# Patient Record
Sex: MALE | Race: WHITE | NOT HISPANIC OR LATINO | Employment: FULL TIME | URBAN - METROPOLITAN AREA
[De-identification: names, ages, dates, MRNs, and addresses within clinical notes are randomized per-mention and may not be internally consistent; named-entity substitution may affect disease eponyms.]

---

## 2017-07-21 ENCOUNTER — NON-PROVIDER VISIT (OUTPATIENT)
Dept: OCCUPATIONAL MEDICINE | Facility: CLINIC | Age: 62
End: 2017-07-21

## 2017-07-21 DIAGNOSIS — Z02.1 PRE-EMPLOYMENT DRUG SCREENING: ICD-10-CM

## 2017-09-19 ENCOUNTER — NON-PROVIDER VISIT (OUTPATIENT)
Dept: URGENT CARE | Facility: PHYSICIAN GROUP | Age: 62
End: 2017-09-19

## 2017-09-19 DIAGNOSIS — Z02.1 PRE-EMPLOYMENT DRUG SCREENING: ICD-10-CM

## 2017-09-19 LAB
AMP AMPHETAMINE: NORMAL
COC COCAINE: NORMAL
INT CON NEG: NEGATIVE
INT CON POS: POSITIVE
MET METHAMPHETAMINES: NORMAL
OPI OPIATES: NORMAL
PCP PHENCYCLIDINE: NORMAL
POC DRUG COMMENT 753798-OCCUPATIONAL HEALTH: NORMAL
THC: NORMAL

## 2017-09-19 PROCEDURE — 80305 DRUG TEST PRSMV DIR OPT OBS: CPT | Performed by: PHYSICIAN ASSISTANT

## 2017-10-05 ENCOUNTER — NON-PROVIDER VISIT (OUTPATIENT)
Dept: OCCUPATIONAL MEDICINE | Facility: CLINIC | Age: 62
End: 2017-10-05

## 2017-10-05 DIAGNOSIS — Z02.1 PRE-EMPLOYMENT DRUG SCREENING: ICD-10-CM

## 2017-10-05 LAB
AMP AMPHETAMINE: NORMAL
COC COCAINE: NORMAL
INT CON NEG: NORMAL
INT CON POS: NORMAL
MET METHAMPHETAMINES: NORMAL
OPI OPIATES: NORMAL
PCP PHENCYCLIDINE: NORMAL
POC DRUG COMMENT 753798-OCCUPATIONAL HEALTH: NORMAL
THC: NORMAL

## 2017-10-05 PROCEDURE — 80305 DRUG TEST PRSMV DIR OPT OBS: CPT | Performed by: PREVENTIVE MEDICINE

## 2018-06-06 ENCOUNTER — NON-PROVIDER VISIT (OUTPATIENT)
Dept: OCCUPATIONAL MEDICINE | Facility: CLINIC | Age: 63
End: 2018-06-06

## 2018-06-06 DIAGNOSIS — Z02.1 PRE-EMPLOYMENT DRUG SCREENING: ICD-10-CM

## 2018-06-06 PROCEDURE — 80305 DRUG TEST PRSMV DIR OPT OBS: CPT | Performed by: PREVENTIVE MEDICINE

## 2020-01-06 ENCOUNTER — HOSPITAL ENCOUNTER (EMERGENCY)
Facility: MEDICAL CENTER | Age: 65
End: 2020-01-06
Attending: EMERGENCY MEDICINE
Payer: COMMERCIAL

## 2020-01-06 VITALS
SYSTOLIC BLOOD PRESSURE: 107 MMHG | HEART RATE: 52 BPM | WEIGHT: 180 LBS | BODY MASS INDEX: 24.38 KG/M2 | DIASTOLIC BLOOD PRESSURE: 66 MMHG | RESPIRATION RATE: 19 BRPM | HEIGHT: 72 IN | TEMPERATURE: 99.7 F | OXYGEN SATURATION: 96 %

## 2020-01-06 DIAGNOSIS — R42 VERTIGO: ICD-10-CM

## 2020-01-06 LAB
ALBUMIN SERPL BCP-MCNC: 3.8 G/DL (ref 3.2–4.9)
ALBUMIN/GLOB SERPL: 1.2 G/DL
ALP SERPL-CCNC: 48 U/L (ref 30–99)
ALT SERPL-CCNC: 124 U/L (ref 2–50)
ANION GAP SERPL CALC-SCNC: 7 MMOL/L (ref 0–11.9)
APPEARANCE UR: CLEAR
AST SERPL-CCNC: 87 U/L (ref 12–45)
BASOPHILS # BLD AUTO: 1.2 % (ref 0–1.8)
BASOPHILS # BLD: 0.05 K/UL (ref 0–0.12)
BILIRUB SERPL-MCNC: 1 MG/DL (ref 0.1–1.5)
BILIRUB UR QL STRIP.AUTO: NEGATIVE
BUN SERPL-MCNC: 18 MG/DL (ref 8–22)
CALCIUM SERPL-MCNC: 9 MG/DL (ref 8.5–10.5)
CHLORIDE SERPL-SCNC: 105 MMOL/L (ref 96–112)
CO2 SERPL-SCNC: 25 MMOL/L (ref 20–33)
COLOR UR: YELLOW
CREAT SERPL-MCNC: 0.68 MG/DL (ref 0.5–1.4)
EKG IMPRESSION: NORMAL
EOSINOPHIL # BLD AUTO: 0.23 K/UL (ref 0–0.51)
EOSINOPHIL NFR BLD: 5.5 % (ref 0–6.9)
ERYTHROCYTE [DISTWIDTH] IN BLOOD BY AUTOMATED COUNT: 38 FL (ref 35.9–50)
GLOBULIN SER CALC-MCNC: 3.3 G/DL (ref 1.9–3.5)
GLUCOSE SERPL-MCNC: 83 MG/DL (ref 65–99)
GLUCOSE UR STRIP.AUTO-MCNC: NEGATIVE MG/DL
HCT VFR BLD AUTO: 45.6 % (ref 42–52)
HGB BLD-MCNC: 15.8 G/DL (ref 14–18)
IMM GRANULOCYTES # BLD AUTO: 0 K/UL (ref 0–0.11)
IMM GRANULOCYTES NFR BLD AUTO: 0 % (ref 0–0.9)
KETONES UR STRIP.AUTO-MCNC: 15 MG/DL
LEUKOCYTE ESTERASE UR QL STRIP.AUTO: NEGATIVE
LYMPHOCYTES # BLD AUTO: 1.22 K/UL (ref 1–4.8)
LYMPHOCYTES NFR BLD: 29 % (ref 22–41)
MCH RBC QN AUTO: 30.7 PG (ref 27–33)
MCHC RBC AUTO-ENTMCNC: 34.6 G/DL (ref 33.7–35.3)
MCV RBC AUTO: 88.5 FL (ref 81.4–97.8)
MICRO URNS: ABNORMAL
MONOCYTES # BLD AUTO: 0.42 K/UL (ref 0–0.85)
MONOCYTES NFR BLD AUTO: 10 % (ref 0–13.4)
NEUTROPHILS # BLD AUTO: 2.29 K/UL (ref 1.82–7.42)
NEUTROPHILS NFR BLD: 54.3 % (ref 44–72)
NITRITE UR QL STRIP.AUTO: NEGATIVE
NRBC # BLD AUTO: 0 K/UL
NRBC BLD-RTO: 0 /100 WBC
PH UR STRIP.AUTO: 6 [PH] (ref 5–8)
PLATELET # BLD AUTO: 161 K/UL (ref 164–446)
PMV BLD AUTO: 10 FL (ref 9–12.9)
POTASSIUM SERPL-SCNC: 4 MMOL/L (ref 3.6–5.5)
PROT SERPL-MCNC: 7.1 G/DL (ref 6–8.2)
PROT UR QL STRIP: NEGATIVE MG/DL
RBC # BLD AUTO: 5.15 M/UL (ref 4.7–6.1)
RBC UR QL AUTO: NEGATIVE
SODIUM SERPL-SCNC: 137 MMOL/L (ref 135–145)
SP GR UR STRIP.AUTO: 1.02
UROBILINOGEN UR STRIP.AUTO-MCNC: 1 MG/DL
WBC # BLD AUTO: 4.2 K/UL (ref 4.8–10.8)

## 2020-01-06 PROCEDURE — 81003 URINALYSIS AUTO W/O SCOPE: CPT

## 2020-01-06 PROCEDURE — 99285 EMERGENCY DEPT VISIT HI MDM: CPT

## 2020-01-06 PROCEDURE — 700111 HCHG RX REV CODE 636 W/ 250 OVERRIDE (IP): Performed by: EMERGENCY MEDICINE

## 2020-01-06 PROCEDURE — 96374 THER/PROPH/DIAG INJ IV PUSH: CPT

## 2020-01-06 PROCEDURE — 93005 ELECTROCARDIOGRAM TRACING: CPT

## 2020-01-06 PROCEDURE — 93005 ELECTROCARDIOGRAM TRACING: CPT | Performed by: EMERGENCY MEDICINE

## 2020-01-06 PROCEDURE — 80053 COMPREHEN METABOLIC PANEL: CPT

## 2020-01-06 PROCEDURE — 85025 COMPLETE CBC W/AUTO DIFF WBC: CPT

## 2020-01-06 RX ORDER — METOCLOPRAMIDE 10 MG/1
10 TABLET ORAL 4 TIMES DAILY PRN
Qty: 20 TAB | Refills: 0 | Status: SHIPPED | OUTPATIENT
Start: 2020-01-06 | End: 2022-05-31

## 2020-01-06 RX ORDER — LORAZEPAM 2 MG/ML
0.5 INJECTION INTRAMUSCULAR ONCE
Status: COMPLETED | OUTPATIENT
Start: 2020-01-06 | End: 2020-01-06

## 2020-01-06 RX ORDER — DIAZEPAM 2 MG/1
2 TABLET ORAL EVERY 6 HOURS PRN
Qty: 10 TAB | Refills: 0 | Status: SHIPPED | OUTPATIENT
Start: 2020-01-06 | End: 2020-01-11

## 2020-01-06 RX ORDER — MECLIZINE HYDROCHLORIDE 25 MG/1
25 TABLET ORAL 3 TIMES DAILY PRN
Qty: 30 TAB | Refills: 0 | Status: SHIPPED | OUTPATIENT
Start: 2020-01-06 | End: 2022-05-31

## 2020-01-06 RX ADMIN — LORAZEPAM 0.5 MG: 2 INJECTION INTRAMUSCULAR; INTRAVENOUS at 10:45

## 2020-01-06 SDOH — HEALTH STABILITY: MENTAL HEALTH: HOW OFTEN DO YOU HAVE A DRINK CONTAINING ALCOHOL?: NEVER

## 2020-01-06 ASSESSMENT — ENCOUNTER SYMPTOMS
HEADACHES: 1
BACK PAIN: 1
EYES NEGATIVE: 1
PSYCHIATRIC NEGATIVE: 1
BLOOD IN STOOL: 0
LOSS OF CONSCIOUSNESS: 0
TREMORS: 0
NECK PAIN: 0
VOMITING: 1
DIARRHEA: 0
NAUSEA: 1
HEARTBURN: 0
FEVER: 0
SEIZURES: 0
SENSORY CHANGE: 0
MYALGIAS: 0
RESPIRATORY NEGATIVE: 1
DIAPHORESIS: 0
FLANK PAIN: 0
SPEECH CHANGE: 0
ABDOMINAL PAIN: 0
CARDIOVASCULAR NEGATIVE: 1
FOCAL WEAKNESS: 0
CONSTIPATION: 0
CHILLS: 0
DIZZINESS: 1
WEIGHT LOSS: 0
TINGLING: 0
FALLS: 0
WEAKNESS: 1

## 2020-01-06 ASSESSMENT — PAIN SCALES - WONG BAKER: WONGBAKER_NUMERICALRESPONSE: HURTS EVEN MORE

## 2020-01-06 NOTE — ED TRIAGE NOTES
Chief Complaint   Patient presents with   • Dizziness     ambulates to room w/steady gait. c/o dizziness describes as room spinning.    • Flank Pain     x few days. also states urine appears dark yellowish brown   • N/V     this morning     Aaox4. No neuro deficits noted. Educated on triage process. Instructed to notify staff for any worsening symptoms.

## 2020-01-06 NOTE — ED PROVIDER NOTES
ED Provider Note    Scribed for Kings Bridges M.D. by Gina Keene. 1/6/2020, 10:20 AM.    Primary care provider: Pcp Pt States None  Means of arrival: Walk-In  History obtained from: Patient  History limited by: None    CHIEF COMPLAINT  Chief Complaint   Patient presents with   • Dizziness     ambulates to room w/steady gait. c/o dizziness describes as room spinning.    • Flank Pain     x few days. also states urine appears dark yellowish brown   • N/V     this morning       HPI  Luis Drake Jr. is a 64 y.o. male who presents to the Emergency Department for evaluation of dizziness onset yesterday. 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 He then vomited, and was sent home. He states that he was able to drive home fine. He reports that his symptoms have been constant since onset. No alleviating or exacerbating factors noted. Associated decreased appetite, dark yellow urine, low back pain, nausea, and headache. Denies tinnitus, aphasia, weakness in lower extremities, diplopia, or any recent illness.No prior history of similar symptoms. Denies use of alcohol, drugs, or cigarettes.     REVIEW OF SYSTEMS  Review of Systems   Constitutional: Positive for malaise/fatigue. Negative for chills, diaphoresis, fever and weight loss.   HENT: Negative.    Eyes: Negative.    Respiratory: Negative.    Cardiovascular: Negative.    Gastrointestinal: Positive for nausea and vomiting. Negative for abdominal pain, blood in stool, constipation, diarrhea, heartburn and melena.   Genitourinary: Negative for dysuria, flank pain, frequency, hematuria and urgency.        Positive for dark yellow urine.    Musculoskeletal: Positive for back pain. Negative for falls, joint pain, myalgias and neck pain.   Skin: Negative.    Neurological: Positive for dizziness, weakness and headaches. Negative for tingling, tremors, sensory change, speech change, focal weakness, seizures and loss of  consciousness.   Endo/Heme/Allergies: Negative.    Psychiatric/Behavioral: Negative.    All other systems reviewed and are negative.      PAST MEDICAL HISTORY  No pertinent past medical history noted.     SURGICAL HISTORY  patient denies any surgical history    SOCIAL HISTORY  Social History     Tobacco Use   • Smoking status: Never Smoker   • Smokeless tobacco: Never Used   Substance Use Topics   • Alcohol use: Never     Frequency: Never   • Drug use: Never      Social History     Substance and Sexual Activity   Drug Use Never       FAMILY HISTORY  History reviewed. No pertinent family history.    CURRENT MEDICATIONS  Home Medications     Reviewed by Danyelle Doll R.N. (Registered Nurse) on 01/06/20 at 0947  Med List Status: <None>   Medication Last Dose Status        Patient Brenden Taking any Medications                     Results for orders placed or performed during the hospital encounter of 01/06/20   CBC WITH DIFFERENTIAL   Result Value Ref Range    WBC 4.2 (L) 4.8 - 10.8 K/uL    RBC 5.15 4.70 - 6.10 M/uL    Hemoglobin 15.8 14.0 - 18.0 g/dL    Hematocrit 45.6 42.0 - 52.0 %    MCV 88.5 81.4 - 97.8 fL    MCH 30.7 27.0 - 33.0 pg    MCHC 34.6 33.7 - 35.3 g/dL    RDW 38.0 35.9 - 50.0 fL    Platelet Count 161 (L) 164 - 446 K/uL    MPV 10.0 9.0 - 12.9 fL    Neutrophils-Polys 54.30 44.00 - 72.00 %    Lymphocytes 29.00 22.00 - 41.00 %    Monocytes 10.00 0.00 - 13.40 %    Eosinophils 5.50 0.00 - 6.90 %    Basophils 1.20 0.00 - 1.80 %    Immature Granulocytes 0.00 0.00 - 0.90 %    Nucleated RBC 0.00 /100 WBC    Neutrophils (Absolute) 2.29 1.82 - 7.42 K/uL    Lymphs (Absolute) 1.22 1.00 - 4.80 K/uL    Monos (Absolute) 0.42 0.00 - 0.85 K/uL    Eos (Absolute) 0.23 0.00 - 0.51 K/uL    Baso (Absolute) 0.05 0.00 - 0.12 K/uL    Immature Granulocytes (abs) 0.00 0.00 - 0.11 K/uL    NRBC (Absolute) 0.00 K/uL   COMP METABOLIC PANEL   Result Value Ref Range    Sodium 137 135 - 145 mmol/L    Potassium 4.0 3.6 - 5.5 mmol/L     Chloride 105 96 - 112 mmol/L    Co2 25 20 - 33 mmol/L    Anion Gap 7.0 0.0 - 11.9    Glucose 83 65 - 99 mg/dL    Bun 18 8 - 22 mg/dL    Creatinine 0.68 0.50 - 1.40 mg/dL    Calcium 9.0 8.5 - 10.5 mg/dL    AST(SGOT) 87 (H) 12 - 45 U/L    ALT(SGPT) 124 (H) 2 - 50 U/L    Alkaline Phosphatase 48 30 - 99 U/L    Total Bilirubin 1.0 0.1 - 1.5 mg/dL    Albumin 3.8 3.2 - 4.9 g/dL    Total Protein 7.1 6.0 - 8.2 g/dL    Globulin 3.3 1.9 - 3.5 g/dL    A-G Ratio 1.2 g/dL   URINALYSIS CULTURE, IF INDICATED   Result Value Ref Range    Color Yellow     Character Clear     Specific Gravity 1.017 <1.035    Ph 6.0 5.0 - 8.0    Glucose Negative Negative mg/dL    Ketones 15 (A) Negative mg/dL    Protein Negative Negative mg/dL    Bilirubin Negative Negative    Urobilinogen, Urine 1.0 Negative    Nitrite Negative Negative    Leukocyte Esterase Negative Negative    Occult Blood Negative Negative    Micro Urine Req see below    ESTIMATED GFR   Result Value Ref Range    GFR If African American >60 >60 mL/min/1.73 m 2    GFR If Non African American >60 >60 mL/min/1.73 m 2   EKG (NOW)   Result Value Ref Range    Report       [facility] Emergency Dept.    Test Date:  2020  Pt Name:    COLLETTE WHITFIELD                Department: ER  MRN:        [id number]                      Room:  Gender:     Male                         Technician: [id number]  :        1955                   Requested By:ER TRIAGE PROTOCOL  Order #:    [id number]                    Reading MD:    Measurements  Intervals                                Axis  Rate:       74                           P:          67  AR:         145                          QRS:        32  QRSD:       91                           T:          25  QT:         396  QTc:        440    Interpretive Statements  Sinus rhythm  No previous ECG available for comparison        ALLERGIES  No Known Allergies    PHYSICAL EXAM  VITAL SIGNS: /82   Pulse 68   Temp 37.6 °C (99.7 °F)  (Temporal)   Resp 14   Ht 1.829 m (6')   Wt 81.6 kg (180 lb)   SpO2 96%   BMI 24.41 kg/m²     Constitutional:  Mild acute distress  HENT:  Moist mucous membranes  Eyes:  Pupils are round and reactive to light. No conjunctivitis or icterus  Neck: trachea is midline, no palpable thyroid  Cardiovascular: Regular rate and rhythm, no murmurs  Thorax & Lungs: Normal breath sounds, no rhonchi  Abdomen: Soft, Non-tender  Skin:. Warm, dry, no rash  Back: Non-tender, no CVA tenderness  Extremities:  no edema  Vascular:  symmetric radial pulse  Neurologic: Alert and oriented. Off balance with Romberg. Right sided gaze nystagmus. Cranial nerves II-XII intact, EOMs intact, no tongue deviation, PERRL, no facial asymmetry to motor or sensation, symmetric palate, normal finger-to-nose test, no pronator drift. No focal motor deficits. Symmetric reflexes. Normal station and gait, normal tandem walk. HiNT exam shows normal test of skew with delayed saccade on the head impulse test implication is peripheral vertigo    LABS  Results for orders placed or performed during the hospital encounter of 01/06/20   CBC WITH DIFFERENTIAL   Result Value Ref Range    WBC 4.2 (L) 4.8 - 10.8 K/uL    RBC 5.15 4.70 - 6.10 M/uL    Hemoglobin 15.8 14.0 - 18.0 g/dL    Hematocrit 45.6 42.0 - 52.0 %    MCV 88.5 81.4 - 97.8 fL    MCH 30.7 27.0 - 33.0 pg    MCHC 34.6 33.7 - 35.3 g/dL    RDW 38.0 35.9 - 50.0 fL    Platelet Count 161 (L) 164 - 446 K/uL    MPV 10.0 9.0 - 12.9 fL    Neutrophils-Polys 54.30 44.00 - 72.00 %    Lymphocytes 29.00 22.00 - 41.00 %    Monocytes 10.00 0.00 - 13.40 %    Eosinophils 5.50 0.00 - 6.90 %    Basophils 1.20 0.00 - 1.80 %    Immature Granulocytes 0.00 0.00 - 0.90 %    Nucleated RBC 0.00 /100 WBC    Neutrophils (Absolute) 2.29 1.82 - 7.42 K/uL    Lymphs (Absolute) 1.22 1.00 - 4.80 K/uL    Monos (Absolute) 0.42 0.00 - 0.85 K/uL    Eos (Absolute) 0.23 0.00 - 0.51 K/uL    Baso (Absolute) 0.05 0.00 - 0.12 K/uL    Immature  Granulocytes (abs) 0.00 0.00 - 0.11 K/uL    NRBC (Absolute) 0.00 K/uL   COMP METABOLIC PANEL   Result Value Ref Range    Sodium 137 135 - 145 mmol/L    Potassium 4.0 3.6 - 5.5 mmol/L    Chloride 105 96 - 112 mmol/L    Co2 25 20 - 33 mmol/L    Anion Gap 7.0 0.0 - 11.9    Glucose 83 65 - 99 mg/dL    Bun 18 8 - 22 mg/dL    Creatinine 0.68 0.50 - 1.40 mg/dL    Calcium 9.0 8.5 - 10.5 mg/dL    AST(SGOT) 87 (H) 12 - 45 U/L    ALT(SGPT) 124 (H) 2 - 50 U/L    Alkaline Phosphatase 48 30 - 99 U/L    Total Bilirubin 1.0 0.1 - 1.5 mg/dL    Albumin 3.8 3.2 - 4.9 g/dL    Total Protein 7.1 6.0 - 8.2 g/dL    Globulin 3.3 1.9 - 3.5 g/dL    A-G Ratio 1.2 g/dL   URINALYSIS CULTURE, IF INDICATED   Result Value Ref Range    Color Yellow     Character Clear     Specific Gravity 1.017 <1.035    Ph 6.0 5.0 - 8.0    Glucose Negative Negative mg/dL    Ketones 15 (A) Negative mg/dL    Protein Negative Negative mg/dL    Bilirubin Negative Negative    Urobilinogen, Urine 1.0 Negative    Nitrite Negative Negative    Leukocyte Esterase Negative Negative    Occult Blood Negative Negative    Micro Urine Req see below    ESTIMATED GFR   Result Value Ref Range    GFR If African American >60 >60 mL/min/1.73 m 2    GFR If Non African American >60 >60 mL/min/1.73 m 2   EKG (NOW)   Result Value Ref Range    Report       [facility] Emergency Dept.    Test Date:  2020  Pt Name:    COLLETTE WHITFIELD                Department: ER  MRN:        [id number]                      Room:  Gender:     Male                         Technician: [id number]  :        1955                   Requested By:ER TRIAGE PROTOCOL  Order #:    [id number]                    Yaquelin STOLL:    Measurements  Intervals                                Axis  Rate:       74                           P:          67  AL:         145                          QRS:        32  QRSD:       91                           T:          25  QT:         396  QTc:         440    Interpretive Statements  Sinus rhythm  No previous ECG available for comparison       All labs reviewed by me.    COURSE & MEDICAL DECISION MAKING  Pertinent Labs & Imaging studies reviewed. (See chart for details)    10:20 AM - Patient seen and examined at bedside. Patient will be treated with Ativan 0.5 mg. Ordered EKG, UA, CMP, and CBC with diff to evaluate his symptoms. The differential diagnoses include but are not limited to: Peripheral vertigo versus central vertigo    11:00 AM - Patient's lab work was reviewed. His liver enzymes are elevated, which could be causing his dark urine.     12:00 PM - Patient was reevaluated at bedside. Discussed lab and radiology results with the patient and informed them that they do have vertigo. I also informed him of his elevated liver enzymes. I advised him to be further evaluated for it. He currently shows no signs of Hepatitis.     12:58 PM - Patient was reevaluated at bedside. I discussed with him that it takes a while for vertigo to go away, and instructed him to take over the counter medication as needed as well as to try balance activities at home. I informed the patient of my plan for discharge, which includes strict return precautions for any new or worsening symptoms. Patient understands and verbalizes agreement to plan of care. Patient is comfortable going home at this time.      Medical Decision Making:   Labs are unremarkable.  The patient's neuro exam reveals right extreme gaze nystagmus.  The HINTS exam reveals a delayed saccade to the head impulse test and a normal test of skew.  This implies a peripheral lesion.  Patient was given benzodiazepine with some improvement.  The remainder of the neuro exam is normal.  I do not think further evaluation is needed.  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  I am in a place him on benzodiazepine meclizine and Reglan he is given return precautions and  follow-up    The patient will return for new or worsening symptoms and is stable at the time of discharge.    The patient is referred to a primary physician for blood pressure management, diabetic screening, and for all other preventative health concerns.    DISPOSITION:  Patient will be discharged home in stable condition.    FOLLOW UP:  [address] [address]  [phone]          OUTPATIENT MEDICATIONS:  Discharge Medication List as of 1/6/2020 12:59 PM      START taking these medications    Details   diazePAM (VALIUM) 2 MG Tab Take 1 Tab by mouth every 6 hours as needed for Anxiety for up to 5 days., Disp-10 Tab, R-0, Print Rx Paper      meclizine (ANTIVERT) 25 MG Tab Take 1 Tab by mouth 3 times a day as needed., Disp-30 Tab, R-0, Print Rx Paper      metoclopramide (REGLAN) 10 MG Tab Take 1 Tab by mouth 4 times a day as needed (prn nausea)., Disp-20 Tab, R-0, Print Rx Paper               FINAL IMPRESSION  1. Vertigo          Gina STEVENS (Scribe), am scribing for, and in the presence of, Kings Bridges M.D..    Electronically signed by: Gina Keene (Scribe), 1/6/2020    Kings STEVENS M.D. personally performed the services described in this documentation, as scribed by Gina Keene in my presence, and it is both accurate and complete.    C    The note accurately reflects work and decisions made by me.  Kings Bridges  1/6/2020  3:19 PM

## 2021-03-03 DIAGNOSIS — Z23 NEED FOR VACCINATION: ICD-10-CM

## 2022-05-19 ENCOUNTER — NON-PROVIDER VISIT (OUTPATIENT)
Dept: OCCUPATIONAL MEDICINE | Facility: CLINIC | Age: 67
End: 2022-05-19

## 2022-05-19 DIAGNOSIS — Z02.89 VISIT FOR OCCUPATIONAL HEALTH EXAMINATION: ICD-10-CM

## 2022-05-19 DIAGNOSIS — Z02.1 PRE-EMPLOYMENT DRUG SCREENING: ICD-10-CM

## 2022-05-19 LAB
AMP AMPHETAMINE: NORMAL
AMP AMPHETAMINE: NORMAL
BAR BARBITURATES: NORMAL
BZO BENZODIAZEPINES: NORMAL
COC COCAINE: NORMAL
COC COCAINE: NORMAL
INT CON NEG: NORMAL
INT CON NEG: NORMAL
INT CON POS: NORMAL
INT CON POS: NORMAL
MDMA ECSTASY: NORMAL
MET METHAMPHETAMINES: NORMAL
MET METHAMPHETAMINES: NORMAL
MTD METHADONE: NORMAL
OPI OPIATES: NORMAL
OPI OPIATES: NORMAL
OXY OXYCODONE: NORMAL
PCP PHENCYCLIDINE: NORMAL
PCP PHENCYCLIDINE: NORMAL
POC DRUG COMMENT 753798-OCCUPATIONAL HEALTH: NORMAL
POC URINE DRUG SCREEN OCDRS: NORMAL
THC: NORMAL
THC: NORMAL

## 2022-05-19 PROCEDURE — 80305 DRUG TEST PRSMV DIR OPT OBS: CPT | Performed by: NURSE PRACTITIONER

## 2022-05-27 ENCOUNTER — APPOINTMENT (OUTPATIENT)
Dept: RADIOLOGY | Facility: MEDICAL CENTER | Age: 67
End: 2022-05-27
Attending: EMERGENCY MEDICINE
Payer: COMMERCIAL

## 2022-05-27 ENCOUNTER — HOSPITAL ENCOUNTER (EMERGENCY)
Facility: MEDICAL CENTER | Age: 67
End: 2022-05-27
Attending: EMERGENCY MEDICINE
Payer: COMMERCIAL

## 2022-05-27 VITALS
TEMPERATURE: 98.9 F | WEIGHT: 188.05 LBS | HEART RATE: 75 BPM | OXYGEN SATURATION: 96 % | SYSTOLIC BLOOD PRESSURE: 120 MMHG | BODY MASS INDEX: 25.47 KG/M2 | RESPIRATION RATE: 16 BRPM | HEIGHT: 72 IN | DIASTOLIC BLOOD PRESSURE: 79 MMHG

## 2022-05-27 DIAGNOSIS — R07.89 CHEST WALL PAIN: ICD-10-CM

## 2022-05-27 PROCEDURE — 99284 EMERGENCY DEPT VISIT MOD MDM: CPT

## 2022-05-27 PROCEDURE — 73000 X-RAY EXAM OF COLLAR BONE: CPT | Mod: LT

## 2022-05-27 PROCEDURE — 71046 X-RAY EXAM CHEST 2 VIEWS: CPT

## 2022-05-27 PROCEDURE — 700102 HCHG RX REV CODE 250 W/ 637 OVERRIDE(OP): Performed by: EMERGENCY MEDICINE

## 2022-05-27 PROCEDURE — A9270 NON-COVERED ITEM OR SERVICE: HCPCS | Performed by: EMERGENCY MEDICINE

## 2022-05-27 RX ORDER — NAPROXEN 500 MG/1
500 TABLET ORAL 2 TIMES DAILY
Status: DISCONTINUED | OUTPATIENT
Start: 2022-05-27 | End: 2022-05-27 | Stop reason: HOSPADM

## 2022-05-27 RX ADMIN — NAPROXEN 500 MG: 500 TABLET ORAL at 12:28

## 2022-05-27 ASSESSMENT — PAIN DESCRIPTION - PAIN TYPE: TYPE: ACUTE PAIN

## 2022-05-27 NOTE — ED PROVIDER NOTES
ED Provider Note    Scribed for Yunior Avila M.D. by Rhiannon Kuo. 5/27/2022  11:59 AM    Primary care provider: Pcp Pt States None  Means of arrival: Walk-in  History obtained from: Patient  History limited by: None    CHIEF COMPLAINT  Chief Complaint   Patient presents with   • Shoulder Pain     New job and lifting tote from knee level to shoulder level, reports felt a pop to left shoulder and unable to lift arm up.         HPI  Luis Drake Jr. is a 66 y.o. male who presents to the Emergency Department for left shoulder pain acute onset last night at work. 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 He reports feeling a sudden incident of injury while working last night. His pain is exacerbated with deep breaths. He denies numbness or tingling in the extremity, fevers, chest pain, shortness of breath, nausea, and vomiting. He denies taking any pain medications. He denies taking any blood thinners. He denies any history of rib fracture.     REVIEW OF SYSTEMS  Pertinent positives include: shoulder pain.  Pertinent negatives include: numbness or tingling in the extremity, fevers, chest pain, shortness of breath, nausea, and vomiting..     PAST MEDICAL HISTORY  Past Medical History:   Diagnosis Date   • Vertigo        FAMILY HISTORY  History reviewed. No pertinent family history.    SOCIAL HISTORY  Social History     Tobacco Use   • Smoking status: Never Smoker   • Smokeless tobacco: Never Used   Vaping Use   • Vaping Use: Never used   Substance Use Topics   • Alcohol use: Never   • Drug use: Never     Social History     Substance and Sexual Activity   Drug Use Never       CURRENT MEDICATIONS  Home Medications     Reviewed by Maura Deleon R.N. (Registered Nurse) on 05/27/22 at 1055  Med List Status: Partial   Medication Last Dose Status   meclizine (ANTIVERT) 25 MG Tab  Active   metoclopramide (REGLAN) 10 MG Tab  Active                ALLERGIES  No Known Allergies    PHYSICAL  EXAM  VITAL SIGNS: /87   Pulse 74   Temp 37.2 °C (99 °F) (Temporal)   Resp 16   Ht 1.829 m (6')   Wt 85.3 kg (188 lb 0.8 oz)   SpO2 95%   BMI 25.50 kg/m²   Reviewed and no hypoxia room air  Constitutional: Well developed, Well nourished, in a moderate degree of pain.   HENT: Normocephalic, atraumatic, bilateral external ears normal, wearing a mask.   Eyes: PERRLA, conjunctiva pink, no scleral icterus.   Cardiovascular: Regular rate and rhythm. No murmurs, rubs or gallops.  No dependent edema or calf tenderness  Respiratory: Lungs clear to auscultation bilaterally. No wheezes, rales, or rhonchi.    Skin: No wounds or bruising  Musculoskeletal: Tenderness under mid clavicle, no crepitus, no bruising or swelling. Resisted range of motion of the shoulder. No palpable tenderness of left shoulder. Shoulder rotation elicits no pain. Radial pulses 2+   Neurologic: Alert & oriented x 3, cranial nerves 2-12 intact by passive exam.  No focal deficit noted. Sensation preserved in 1st and 5th fingers, Wrist extension, flexion, finger abduction, and thumb opposition, shoulder abduction are 5/5 bilaterally.   Extensor hallucis longus and ankle plantar flexion are symmetric. Sensation is intact on the pad of the first and fifth finger, over the first dorsal web space of the hand, And over the deltoid.  Sensation is intact to light touch in both legs.   Psychiatric: Affect normal, Judgment normal, Mood normal.     DIFFERENTIAL DIAGNOSIS:  Clavicle fracture, rib fracture, muscle strain, doubt pneumothorax.    RADIOLOGY/PROCEDURES  DX-CHEST-2 VIEWS   Final Result      No evidence of acute cardiopulmonary disease      DX-CLAVICLE LEFT   Final Result      1.  No radiographic evidence of acute traumatic injury.   2.  Deformity of the LEFT humeral head suggests remote trauma with prior dislocation, possibly a Hill-Sachs lesion.        Radiologist interpretation have been reviewed by me.     INTERVENTIONS:  Medications    naproxen (NAPROSYN) tablet 500 mg (500 mg Oral Given 5/27/22 1228)       ED COURSE:  Nursing notes, VS, PMSFHx reviewed in chart.     11:59 AM - Patient seen and examined at bedside. Patient will be treated with Naprosyn 500 mg for his symptoms. Ordered DX-Clavicle Left and DX-Chest to evaluate.     12:55 PM - Patient was reevaluated at bedside. Discussed lab and radiology results with the patient and informed them about the plan for discharge at this time in [facility] and have him follow up with [facility]. Patient verbalizes understanding and agreement to this plan of care.     [facility]    MEDICAL DECISION MAKING:  This patient presents with a chest wall injury after lifting at work.  There is no evidence of hemopneumothorax, radiographic rib fracture or clavicle fracture.  He may have a costochondral junction or cartilage fracture.  Intercostal strain is also possible.    PLAN:  NSAIDs, Tylenol, ice  Sling for 3 days then range of motion exercises  C4 form completed  Return for shortness of breath or dizziness    [address] [address] [phone]  Schedule an appointment as soon as possible for a visit in 3 days      CONDITION: good.     FINAL IMPRESSION  1. Chest wall pain          Rhiannon STEVENS (Scribe), am scribing for, and in the presence of, Yunior Avila M.D..    Electronically signed by: Rhiannon Kou (Scribe), 5/27/2022    Yunior STEVENS M.D. personally performed the services described in this documentation, as scribed by Rhiannon Kuo in my presence, and it is both accurate and complete.    The note accurately reflects work and decisions made by me.  Yunior Avila M.D.  5/27/2022  4:45 PM

## 2022-05-27 NOTE — LETTER
FORM C-4:  EMPLOYEE’S CLAIM FOR COMPENSATION/ REPORT OF INITIAL TREATMENT  EMPLOYEE’S CLAIM - PROVIDE ALL INFORMATION REQUESTED   First Name   Luis Last Name   Block Birthdate   1955  Sex male Claim Number   Home Address [address]   [place]             Zip [address]                                   Age  66 y.o. Height  1.829 m (6') Weight  85.3 kg (188 lb 0.8 oz) [facility]     Mailing Address [address]  [place]              Zip [address] Telephone  [phone] (home)  Primary Language Spoken  ENGLISH   Insurer   Third Party   NAYLA / SHAWANDA [employer] Employee's Occupation (Job Title) When Injury or Occupational Disease Occurred     Employer's Name    [employer] Telephone   [phone]    Employer Address   [address] Zip   [address]   Date of Injury  05/26/2022         Hour of Injury  8:10 PM Date Employer Notified  05/26/2022 Last Day of Work after Injury or Occupational Disease  N/A   Supervisor to Whom Injury Reported  HIDEI   Address or Location of Accident (if applicable)    [address]   What were you doing at the time of accident? (if applicable)    LIFTING BINS OF PRODUCT   How did this injury or occupational disease occur? Be specific and answer in detail. Use additional sheet if necessary)  LIFTING BINS FROM CART ONTO LINE SPECIFIED FOR STOWING ( APPROX [phone] LBS).     If you believe that you have an occupational disease, when did you first have knowledge of the disability and it relationship to your employment?   N/A Witnesses to the Accident  NONE   Nature of Injury or Occupational Disease  N/A   Part(s) of Body Injured or Affected  CHEST MUSCLE  , ,     I CERTIFY THAT THE ABOVE IS TRUE AND CORRECT TO THE BEST OF MY KNOWLEDGE AND THAT I HAVE PROVIDED THIS INFORMATION IN ORDER TO OBTAIN THE BENEF[place]IVE [place] CHAPTER 617 OF [place]).  I HEREBY AUTHORIZE ANY PHYSICIAN, CHIROPRACTOR, SURGEON, PRACTITIONER, OR OTHER PERSON, ANY HOSPITAL, INCLUDING [facility], ANY MEDICAL SERVICE ORGANIZATION, ANY INSURANCE COMPANY, OR OTHER INSTITUTION OR ORGANIZATION TO RELEASE TO EACH OTHER, ANY MEDICAL OR OTHER INFORMATION, INCLUDING BENEFITS PAID OR PAYABLE, PERTINENT TO THIS INJURY OR DISEASE, EXCEPT INFORMATION RELATIVE TO DIAGNOSIS, TREATMENT AND/OR COUNSELING FOR AIDS, PSYCHOLOGICAL CONDITIONS, ALCOHOL OR CONTROLLED SUBSTANCES, FOR WHICH I MUST GIVE SPECIFIC AUTHORIZATION.  A PHOTOSTAT OF THIS AUTHORIZATION SHALL BE AS VALID AS THE ORIGINAL.  Date                                      Place                                                                             Employee’s Signature   THIS REPORT MUST BE COMPLETED AND MAILED WITHIN 3 WORKING DAYS OF TREATMENT   Place [facility], EMERGENCY DEPT                       Name of Facility [facility]   Date  5/27/2022 Diagnosis  (R07.89) Chest wall pain Is there evidence the injured employee was under the influence of alcohol and/or another controlled substance at the time of accident?   Hour  11:56 AM Description of Injury or Disease  Chest wall pain No   Treatment  Nsaid, tylenol, ice, sling  Have you advised the patient to remain off work five days or more?         No   X-Ray Findings  Negative  Comments:clavicle and CXR If Yes   From Date    To Date      From information given by the employee, together with medical evidence, can you directly connect this injury or occupational disease as job incurred?      If No, is employee capable of: Full Duty  No Modified Duty  Yes   Is additional medical care by a physician indicated?      If Modified Duty, Specify any Limitations / Restrictions   No lifting left arm until cleared occupational health   Do you know of any previous  "injury or disease contributing to this condition or occupational disease?         Date  6/10/2022 Print Doctor’s Name   Efrem Avila certify the employer’s copy of this form was mailed on:   Address [address] [place] [address]  [place] [address] [phone] INSURER’S USE ONLY   Provider’s Tax ID Number  [id number] Telephone Dept: [phone]    Doctor’s Signature   alison-EFREM Melara M.D. Degree     MD      Form C-4 (rev.10/07)                                                                         BRIEF DESCRIPTION OF RIGHTS AND BENEFITS  (Pursuant to [place]RS 616C.050)    Notice of Injury or Occupational Disease (Incident Report Form C-1): If an injury or occupational disease (OD) arises out of and in the course of employment, you must provide written notice to your employer as soon as practicable, but no later than 7 days after the accident or OD. Your employer shall maintain a sufficient supply of the required forms.    Claim for Compensation (Form C-4): If medical treatment is sought, the form C-4 is available at the place of initial treatment. A completed \"Claim for Compensation\" (Form C-4) must be filed within 90 days after an accident or OD. The treating physician or chiropractor must, within 3 working days after treatment, complete and mail to the employer, the employer's insurer and third-party , the Claim for Compensation.    Medical Treatment: If you require medical treatment for your on-the-job injury or OD, you may be required to select a physician or chiropractor from a list provided by your workers’ compensation insurer, if it has contracted with an Organization for Managed Care (MCO) or Preferred Provider Organization (PPO) or providers of health care. If your employer has not entered into a contract with an MCO or PPO, you may select a physician or chiropractor from the Panel of Physicians and Chiropractors. Any medical costs related to your industrial injury or OD will be paid by your " insurer.    Temporary Total Disability (TTD): If your doctor has certified that you are unable to work for a period of at least 5 consecutive days, or 5 cumulative days in a 20-day period, or places restrictions on you that your employer does not accommodate, you may be entitled to TTD compensation.    Temporary Partial Disability (TPD): If the wage you receive upon reemployment is less than the compensation for TTD to which you are entitled, the insurer may be required to pay you TPD compensation to make up the difference. TPD can only be paid for a maximum of 24 months.    Permanent Partial Disability (PPD): When your medical condition is stable and there is an indication of a PPD as a result of your injury or OD, within 30 days, your insurer must arrange for an evaluation by a rating physician or chiropractor to determine the degree of your PPD. The amount of your PPD award depends on the date of injury, the results of the PPD evaluation, your age and wage.    Permanent Total Disability (PTD): If you are medically certified by a treating physician or chiropractor as permanently and totally disabled and have been granted a PTD status by your insurer, you are entitled to receive monthly benefits not to exceed 66 2/3% of your average monthly wage. The amount of your PTD payments is subject to reduction if you previously received a lump-sum PPD award.    Vocational Rehabilitation Services: You may be eligible for vocational rehabilitation services if you are unable to return to the job due to a permanent physical impairment or permanent restrictions as a result of your injury or occupational disease.    Transportation and Per Annette Reimbursement: You may be eligible for travel expenses and per annette associated with medical treatment.    Reopening: You may be able to reopen your claim if your condition worsens after claim closure.     Appeal Process: If you disagree with a written determination issued by the insurer or  the insurer does not respond to your request, you may appeal to the Department of Administration, , by following the instructions contained in your determination letter. You must appeal the determination within 70 days from the date of the determination letter at [address], [address], [place], [place] [address], or [address], [address], [place], [place] [address]. If you disagree with the  decision, you may appeal to the Department of Administration, . You must file your appeal within 30 days from the date of the  decision letter at [address], [address], [place], [place] [address], or [address], [address], [place], [place] [address]. If you disagree with a decision of an , you may file a petition for judicial review with the District Court. You must do so within 30 days of the Appeal Officer’s decision. You may be represented by an  at your own expense or you may contact the [facility] for possible representation.    [place]  for Injured Workers (NAIW): If you disagree with a  decision, you may request that NAIW represent you without charge at an  Hearing. For information regarding denial of benefits, you may contact the [facility] at: [address], [address], [place] [address], [phone], or [address], [place] [address], [phone]    To File a Complaint with the Division: If you wish to file a complaint with the  of the Division of Industrial Relations (DIR),  please contact the Workers’ Compensation Section, [address], [address], [place], [place] [address], telephone ([phone]) [phone], or [address], [place], [place] [address], telephone [phone].    For assistance with Workers’ Compensation Issues: You may contact the [facility] Office for Consumer Health Assistance,  [facility] [address] [facility], [place], [address], [place] [address], Toll Free 1-469.432.4154, Web site: http://[facility].[place].gov/Programs/RAJWINDER E-mail: [email or web address].[place].gov  D-2 (rev. 10/20)              __________________________________________________________________                                    _________________            Employee Name / Signature                                                                                                                            Date

## 2022-05-27 NOTE — LETTER
FORM C-4:  EMPLOYEE’S CLAIM FOR COMPENSATION/ REPORT OF INITIAL TREATMENT  EMPLOYEE’S CLAIM - PROVIDE ALL INFORMATION REQUESTED   First Name   Luis Last Name   Block Birthdate   1955  Sex   male Claim Number   Home Address [address]   [place]             [place] [address]                                   Age  66 y.o. Height  1.829 m (6') Weight  85.3 kg (188 lb 0.8 oz) [facility]  [id number]   Mailing Address [address]  [place]              Zip [address] Telephone  [phone] (home)  Primary Language Spoken   Insurer  *** Third Party   N/A Employee's Occupation (Job Title) When Injury or Occupational Disease Occurred     Employer's Name  Telephone [phone]    Employer Address [address] [place] [address]   Date of Injury         Hour of Injury   Date Employer Notified   Last Day of Work after Injury or Occupational Disease   Supervisor to Whom Injury Reported     Address or Location of Accident (if applicable)    What were you doing at the time of accident? (if applicable)     How did this injury or occupational disease occur? Be specific and answer in detail. Use additional sheet if necessary)     If you believe that you have an occupational disease, when did you first have knowledge of the disability and it relationship to your employment?  Witnesses to the Accident     Nature of Injury or Occupational Disease   Part(s) of Body Injured or Affected  , ,     I CERTIFY THAT THE ABOVE IS TRUE AND CORRECT TO THE BEST OF MY KNOWLEDGE AND THAT I HAVE PROVIDED THIS INFORMATION IN ORDER TO OBTAIN THE B[place]EF[place], [place]IVE [place] CHAPTER 617 [place]).  I HEREBY AUTHORIZE ANY PHYSICIAN, CHIROPRACTOR, SURGEON, PRACTITIONER, OR OTHER PERSON, ANY HOSPITAL, INCLUDING [facility] OR [facility], ANY MEDICAL SERVICE ORGANIZATION, ANY  INSURANCE COMPANY, OR OTHER INSTITUTION OR ORGANIZATION TO RELEASE TO EACH OTHER, ANY MEDICAL OR OTHER INFORMATION, INCLUDING BENEFITS PAID OR PAYABLE, PERTINENT TO THIS INJURY OR DISEASE, EXCEPT INFORMATION RELATIVE TO DIAGNOSIS, TREATMENT AND/OR COUNSELING FOR AIDS, PSYCHOLOGICAL CONDITIONS, ALCOHOL OR CONTROLLED SUBSTANCES, FOR WHICH I MUST GIVE SPECIFIC AUTHORIZATION.  A PHOTOSTAT OF THIS AUTHORIZATION SHALL BE AS VALID AS THE ORIGINAL.  Date                                      Place                                                                             Employee’s Signature   THIS REPORT MUST BE COMPLETED AND MAILED WITHIN 3 WORKING DAYS OF TREATMENT   Place [facility], EMERGENCY DEPT                       Name of Facility [facility]   Date  5/27/2022 Diagnosis  (R07.89) Chest wall pain Is there evidence the injured employee was under the influence of alcohol and/or another controlled substance at the time of accident?   Hour  12:00 PM Description of Injury or Disease  Chest wall pain No   Treatment  Nsaid, tylenol, ice, sling  Have you advised the patient to remain off work five days or more?         No   X-Ray Findings  Negative  Comments:clavicle and CXR If Yes   From Date    To Date      From information given by the employee, together with medical evidence, can you directly connect this injury or occupational disease as job incurred?   If No, is employee capable of: Full Duty  No Modified Duty  Yes   Is additional medical care by a physician indicated?   If Modified Duty, Specify any Limitations / Restrictions   No lifting left arm until cleared occupational health   Do you know of any previous injury or disease contributing to this condition or occupational disease?      Date 6/9/2022 Print Doctor’s Name Yunior Avila I certify the employer’s copy of this form was mailed on:   Address [address] [place] [address] [phone] INSURER’S USE ONLY  "  Provider’s Tax ID Number   Telephone Dept: [phone]    Doctor’s Signature EFREM Gil M.D. Degree        Form C-4 (rev.10/07)                                                                         BRIEF DESCRIPTION OF RIGHTS AND BENEFITS  ([place]ursuant to [place]RS 616C.050)    Notice of Injury or Occupational Disease (Incident Report Form C-1): If an injury or occupational disease (OD) arises out of and in the course of employment, you must provide written notice to your employer as soon as practicable, but no later than 7 days after the accident or OD. Your employer shall maintain a sufficient supply of the required forms.    Claim for Compensation (Form C-4): If medical treatment is sought, the form C-4 is available at the place of initial treatment. A completed \"Claim for Compensation\" (Form C-4) must be filed within 90 days after an accident or OD. The treating physician or chiropractor must, within 3 working days after treatment, complete and mail to the employer, the employer's insurer and third-party , the Claim for Compensation.    Medical Treatment: If you require medical treatment for your on-the-job injury or OD, you may be required to select a physician or chiropractor from a list provided by your workers’ compensation insurer, if it has contracted with an Organization for Managed Care (MCO) or Preferred Provider Organization (PPO) or providers of health care. If your employer has not entered into a contract with an MCO or PPO, you may select a physician or chiropractor from the Panel of Physicians and Chiropractors. Any medical costs related to your industrial injury or OD will be paid by your insurer.    Temporary Total Disability (TTD): If your doctor has certified that you are unable to work for a period of at least 5 consecutive days, or 5 cumulative days in a 20-day period, or places restrictions on you that your employer does not accommodate, you may be entitled to TTD " compensation.    Temporary Partial Disability (TPD): If the wage you receive upon reemployment is less than the compensation for TTD to which you are entitled, the insurer may be required to pay you TPD compensation to make up the difference. TPD can only be paid for a maximum of 24 months.    Permanent Partial Disability (PPD): When your medical condition is stable and there is an indication of a PPD as a result of your injury or OD, within 30 days, your insurer must arrange for an evaluation by a rating physician or chiropractor to determine the degree of your PPD. The amount of your PPD award depends on the date of injury, the results of the PPD evaluation, your age and wage.    Permanent Total Disability (PTD): If you are medically certified by a treating physician or chiropractor as permanently and totally disabled and have been granted a PTD status by your insurer, you are entitled to receive monthly benefits not to exceed 66 2/3% of your average monthly wage. The amount of your PTD payments is subject to reduction if you previously received a lump-sum PPD award.    Vocational Rehabilitation Services: You may be eligible for vocational rehabilitation services if you are unable to return to the job due to a permanent physical impairment or permanent restrictions as a result of your injury or occupational disease.    Transportation and Per Annette Reimbursement: You may be eligible for travel expenses and per annette associated with medical treatment.    Reopening: You may be able to reopen your claim if your condition worsens after claim closure.     Appeal Process: If you disagree with a written determination issued by the insurer or the insurer does not respond to your request, you may appeal to the Department of Administration, , by following the instructions contained in your determination letter. You must appeal the determination within 70 days from the date of the determination letter at [address], [address], [place], [place] [address], or [address], [place], [place] [address]. If you disagree with the  decision, you may appeal to the Department of Administration, . You must file your appeal within 30 days from the date of the  decision letter at [address], [place], [place] [address], or [address], [place], [place] [address]. If you disagree with a decision of an , you may file a petition for judicial review with the District Court. You must do so within 30 days of the Appeal Officer’s decision. You may be represented by an  at your own expense or you may contact the [facility] for possible representation.    [place]  for [place] Workers ([place]IW): If you disagree with a  decision, you may request that NAIW represent you without charge at an  Hearing. For information regarding denial of benefits, you may contact the [facility] at: [address], [address], [place] [address], [phone], or [address], [place] [address], [phone]    To File a Complaint with the Division: If you wish to file a complaint with the  of the Division of Industrial Relations (DIR),  please contact the Workers’ Compensation Section, [address], [address], [place], [place] [address], telephone ([phone]) [phone], or [address], [address], [place], [place] [address], telephone [phone].    For assistance with Workers’ Compensation Issues: You may contact the [facility] Office for Consumer Health Assistance, [address], [address], [place], [place] [address], Toll Free 1-695.709.6008, Web site: http://[facility].[place].gov/Programs/KORTNEY E-mail: [email or web address].gov  D-2 (rev. 10/20)              __________________________________________________________________                                     _________________            Employee Name / Signature                                                                                                                            Date

## 2022-05-27 NOTE — DISCHARGE INSTRUCTIONS
You have injured a rib, a cartilage space or next to a rib or an intercostal muscle.  Use sling as needed for up to 3 days.  Ice the area.  Take ibuprofen 600 mg 3 times a day or naproxen 440 mg twice a day unless it upsets the stomach.  Follow-up with occupational health.  Return for shortness of breath.  This is not expected.

## 2022-05-27 NOTE — ED TRIAGE NOTES
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       Pt Informed regarding triage process and verbalized understanding to inform triage tech or RN for any changes in condition. Placed in lobby.

## 2022-05-31 ENCOUNTER — OCCUPATIONAL MEDICINE (OUTPATIENT)
Dept: URGENT CARE | Facility: PHYSICIAN GROUP | Age: 67
End: 2022-05-31
Payer: COMMERCIAL

## 2022-05-31 VITALS
HEART RATE: 64 BPM | DIASTOLIC BLOOD PRESSURE: 86 MMHG | RESPIRATION RATE: 12 BRPM | WEIGHT: 193 LBS | TEMPERATURE: 97.7 F | BODY MASS INDEX: 26.14 KG/M2 | HEIGHT: 72 IN | SYSTOLIC BLOOD PRESSURE: 140 MMHG | OXYGEN SATURATION: 95 %

## 2022-05-31 DIAGNOSIS — S46.912A STRAIN OF LEFT SHOULDER, INITIAL ENCOUNTER: ICD-10-CM

## 2022-05-31 LAB
AMP AMPHETAMINE: NORMAL
BAR BARBITURATES: NORMAL
BREATH ALCOHOL COMMENT: NORMAL
BZO BENZODIAZEPINES: NORMAL
COC COCAINE: NORMAL
INT CON NEG: NORMAL
INT CON POS: NORMAL
MDMA ECSTASY: NORMAL
MET METHAMPHETAMINES: NORMAL
MTD METHADONE: NORMAL
OPI OPIATES: NORMAL
OXY OXYCODONE: NORMAL
PCP PHENCYCLIDINE: NORMAL
POC BREATHALIZER: 0 PERCENT (ref 0–0.01)
POC URINE DRUG SCREEN OCDRS: NEGATIVE
THC: NORMAL

## 2022-05-31 PROCEDURE — 99213 OFFICE O/P EST LOW 20 MIN: CPT | Performed by: FAMILY MEDICINE

## 2022-05-31 PROCEDURE — 80305 DRUG TEST PRSMV DIR OPT OBS: CPT | Performed by: FAMILY MEDICINE

## 2022-05-31 PROCEDURE — 82075 ASSAY OF BREATH ETHANOL: CPT | Performed by: FAMILY MEDICINE

## 2022-05-31 NOTE — LETTER
[facility]  [address] [place] [address]. [address] - [place] [address]  Phone:  [phone] - Fax:  [phone]   [facility] Progress Report and Disability Certification  Date of Service: 2022   No Show:  No  Date / Time of Next Visit: 2022@8:00AM   Claim Information   Patient Name: Luis Drake Jr.  Claim Number:     Employer:   [employer] Date of Injury: 2022     Insurer / TPA: [place]  ID / SSN:     Occupation:   Diagnosis: There were no encounter diagnoses.    Medical Information   Related to Industrial Injury? Yes    Subjective Complaints:  DOI:       S/p left shoulder strain from lifting at work      Reports pain is slightly improved.          . Pertinent negatives include no chest pain, muscle weakness, numbness or tingling. Lifting the arm aggravates the symptoms. pt has tried tylenol for the symptoms - minor improvement.        Objective Findings:      Left shoulder: pt exhibits  decreased range of motion, tenderness (anterior) and pain.  + TTP over medial aspect of clavicle    There is no effusion and no crepitus.  no muscle spasm.    Deltoid ,  strength is 5/5.  No cervical spine tenderness.    Pre-Existing Condition(s):     Assessment:   Condition Improved    Status: Additional Care Required  Permanent Disability:No    Plan: Medication    Diagnostics:      Comments:       Disability Information   Status:      From:  2022  Through: 2022 Restrictions are: Temporary   Physical Restrictions   Sitting:    Standing:    Stooping:    Bending:      Squatting:    Walking:    Climbing:    Pushin hrs/day   Pulling:    Other:    Reaching Above Shoulder (L): 0 hrs/day Reaching Above Shoulder (R):       Reaching Below Shoulder (L):    Reaching Below Shoulder (R):      Not to exceed Weight Limits   Carrying(hrs):   Weight Limit(lb): < or = to 10 pounds Lifting(hrs):   Weight  Limit(lb): < or = to 10  terry   Comments:  shoulder strain  Slowly improving  Restrictions per D39    Follow up in one week      Repetitive Actions   Hands: i.e. Fine Manipulations from Grasping:     Feet: i.e. Operating Foot Controls:     Driving / Operate Machinery:     Health Care Provider’s Original or Electronic Signature  Julian Royal M.D. Health Care Provider’s Original or Electronic Signature    Jordan Goldstein MD         Clinic Name / Location: [address]  [place] [address] Clinic Phone Number: Dept: [phone]   Appointment Time: 4:10 Pm Visit Start Time: 5:17 PM   Check-In Time:  4:38 Pm Visit Discharge Time:  6:12PM   Original-Treating Physician or Chiropractor    Page 2-Insurer/TPA    Page 3-Employer    Page 4-Employee

## 2022-05-31 NOTE — LETTER
EMPLOYEE’S CLAIM FOR COMPENSATION/ REPORT OF INITIAL TREATMENT  FORM C-4    EMPLOYEE’S CLAIM - PROVIDE ALL INFORMATION REQUESTED   First Name  Luis Last Name  Block Birthdate                    1955                Sex  male Claim Number (Insurer’s Use Only)    Home Address  [address]  [address] Age  66 y.o. Height  1.829 m (6') Weight  87.5 kg (193 lb) N     [facility] Zip  [address] Telephone  [phone] (home)    Mailing Address  [address] [facility] Zip  [address] Primary Language Spoken  English    Insurer   Third-Party   Luis Crandall   Employee's Occupation (Job Title) When Injury or Occupational Disease Occurred      Employer's Name/Company Name   [employer]  Telephone  [phone]    Office Mail Address (Number and Street)   [address] [place] [address]  Zip  [address]    Date of Injury  5/26/2022               Hours Injury  8:10 PM Date Employer Notified  5/26/2022 Last Day of Work after Injury     or Occupational Disease  5/26/2022 Supervisor to Whom Injury     Reported  [place]   Address or Location of Accident (if applicable)  [[address] [facility] [address] [facility]]   What were you doing at the time of accident? (if applicable)  Lifting Bins of product    How did this injury or occupational disease occur? (Be specific an answer in detail. Use additional sheet if necessary)  Lifting bins from cart onto line specified for stowing (appox 55-60Lbs)   If you believe that you have an occupational disease, when did you first have knowledge of the disability and it relationship to your employment?  N/A Witnesses to the Accident  None      Nature of Injury or Occupational Disease  Strain  Part(s) of Body Injured or Affected  Chest, N/A, N/A    I certify that the above is true and correct to the best of my knowledge and that I have provided this  information in order to obtain the benefits of [place]).  I hereby authorize any physician, chiropractor, surgeon, practitioner, or other person, any hospital, including [facility] or [facility], any medical service organization, any insurance company, or other institution or organization to release to each other, any medical or other information, including benefits paid or payable, pertinent to this injury or disease, except information relative to diagnosis, treatment and/or counseling for AIDS, psychological conditions, alcohol or controlled substances, for which I must give specific authorization.  A Photostat of this authorization shall be as valid as the original.     Date 5/31/2022   [facility] Employee’s Original or  *Electronic Signature   THIS REPORT MUST BE COMPLETED AND MAILED WITHIN 3 WORKING DAYS OF TREATMENT   [facility]  Name of Facility  [place]   Date  5/31/2022 Diagnosis and Description of Injury or Occupational Disease  No diagnosis found. Is there evidence the injured employee was under the influence of alcohol and/or another controlled substance at the time of accident?  ? No ? Yes (if yes, please explain)    Hour  5:17 PM   There were no encounter diagnoses.     Treatment     Have you advised the patient to remain off work five days or     more?    X-Ray Findings      ? Yes Indicate dates:   From   To      From information given by the employee, together with medical evidence, can        you directly connect this injury or occupational disease as job incurred?    ? No If no, is the injured employee capable of:  ? full duty    ? modified duty      Is additional medical care by a physician indicated?    If Modified Duty, Specify any Limitations / Restrictions      Do you know of any previous injury or disease contributing to  "this condition or occupational disease?  ? Yes ? No (Explain if yes)                              Date  5/31/2022 Print Health Care Provider's   Julian Rees M.D. I certify the employer’s copy of  this form was mailed on:   Address  [address] [place] [address]. [address] Insurer’s Use Only     [facility] Zip  [address]    Provider’s Tax ID Number  [id number] Telephone  Dept: [phone]             Health Care Provider’s Original or Electronic Signature  e-SignJULIAN REES M.D. Degree (MD,DO, DC,PABernadetteC,APRN)   MD      * Complete and attach Release of Information (Form C-4A) when injured employee signs C-4 Form electronically  ORIGINAL - TREATING HEALTHCARE PROVIDER PAGE 2 - INSURER/TPA PAGE 3 - EMPLOYER PAGE 4 - EMPLOYEE             Form C-4 (rev.08/21)           BRIEF DESCRIPTION OF RIGHTS AND BENEFITS  (Pursuant to [place] 616C.050)    Notice of Injury or Occupational Disease (Incident Report Form C-1): If an injury or occupational disease (OD) arises out of and in the course of employment, you must provide written notice to your employer as soon as practicable, but no later than 7 days after the accident or OD. Your employer shall maintain a sufficient supply of the required forms.    Claim for Compensation (Form C-4): If medical treatment is sought, the form C-4 is available at the place of initial treatment. A completed \"Claim for Compensation\" (Form C-4) must be filed within 90 days after an accident or OD. The treating physician or chiropractor must, within 3 working days after treatment, complete and mail to the employer, the employer's insurer and third-party , the Claim for Compensation.    Medical Treatment: If you require medical treatment for your on-the-job injury or OD, you may be required to select a physician or chiropractor from a list provided by your workers’ compensation insurer, if it has contracted with an Organization for Managed Care (MCO) or Preferred Provider " Organization (PPO) or providers of health care. If your employer has not entered into a contract with an MCO or PPO, you may select a physician or chiropractor from the Panel of Physicians and Chiropractors. Any medical costs related to your industrial injury or OD will be paid by your insurer.    Temporary Total Disability (TTD): If your doctor has certified that you are unable to work for a period of at least 5 consecutive days, or 5 cumulative days in a 20-day period, or places restrictions on you that your employer does not accommodate, you may be entitled to TTD compensation.    Temporary Partial Disability (TPD): If the wage you receive upon reemployment is less than the compensation for TTD to which you are entitled, the insurer may be required to pay you TPD compensation to make up the difference. TPD can only be paid for a maximum of 24 months.    Permanent Partial Disability (PPD): When your medical condition is stable and there is an indication of a PPD as a result of your injury or OD, within 30 days, your insurer must arrange for an evaluation by a rating physician or chiropractor to determine the degree of your PPD. The amount of your PPD award depends on the date of injury, the results of the PPD evaluation, your age and wage.    Permanent Total Disability (PTD): If you are medically certified by a treating physician or chiropractor as permanently and totally disabled and have been granted a PTD status by your insurer, you are entitled to receive monthly benefits not to exceed 66 2/3% of your average monthly wage. The amount of your PTD payments is subject to reduction if you previously received a lump-sum PPD award.    Vocational Rehabilitation Services: You may be eligible for vocational rehabilitation services if you are unable to return to the job due to a permanent physical impairment or permanent restrictions as a result of your injury or occupational disease.    Transportation and Per Jessica  Reimbursement: You may be eligible for travel expenses and per annette associated with medical treatment.    Reopening: You may be able to reopen your claim if your condition worsens after claim closure.     Appeal Process: If you disagree with a written determination issued by the insurer or the insurer does not respond to your request, you may appeal to the Department of Administration, , by following the instructions contained in your determination letter. You must appeal the determination within 70 days from the date of the determination letter at [address], [address], [place], [place] [address], or [address], [address], [place], [place] [address]. If you disagree with the  decision, you may appeal to the Department of Administration, . You must file your appeal within 30 days from the date of the  decision letter at [address], [address], [place], [place] [address], or [address], [address], [place], [place] [address]. If you disagree with a decision of an , you may file a petition for judicial review with the District Court. You must do so within 30 days of the Appeal Officer’s decision. You may be represented by an  at your own expense or you may contact the [facility] for possible representation.    [place]  for Injured Workers (NAIW): If you disagree with a  decision, you may request that NAIW represent you without charge at an  Hearing. For information regarding denial of benefits, you may contact the [facility] at: [address], [address], [place] [address], [phone], or [address], [facility] [address] [address], [phone]    To File a Complaint with the Division: If you wish to file a complaint with the  of the Division of Industrial Relations (DIR),  please contact the Workers’ Compensation Section, [address] [place] [address]  [place], [address], [place], [place] [address], telephone [phone], or [address], [address], [place], [place] [address], telephone [phone].    For assistance with Workers’ Compensation Issues: You may contact the [facility] Office for Consumer Health Assistance, [address], [address], [place], [place] [address], Toll Free 1-735.514.6574, Web site: http://[facility].[place].gov/Programs/RAJWINDER E-mail: rajwinder@[email or web address].[email or web address].[facility]              __________________________________________________________________                                    _________________            Employee Name / Signature                                                                                                                            Date                                                                                                                                                                                                                              D-2 (rev. 10/20)

## 2022-06-01 NOTE — PROGRESS NOTES
Subjective:      Chief Complaint   Patient presents with   • Work-Related Injury      [employer]/L clavicle and shoulder injury/DOI 5/26/22. Pt was picking up 60lb supplies when he accidentally injured his L shoulder. Limited ROM, sharp pain, bruising.              Shoulder Injury     DOI: 5./26    Here for f/u:        S/p left shoulder strain from lifting at work      Reports pain is slightly improved.          . Pertinent negatives include no chest pain, muscle weakness, numbness or tingling. Lifting the arm aggravates the symptoms. pt has tried tylenol for the symptoms - minor improvement.       Social History     Tobacco Use   • Smoking status: Never Smoker   • Smokeless tobacco: Never Used   Vaping Use   • Vaping Use: Never used   Substance Use Topics   • Alcohol use: Never   • Drug use: Never         Current Outpatient Medications on File Prior to Visit   Medication Sig Dispense Refill   • meclizine (ANTIVERT) 25 MG Tab Take 1 Tab by mouth 3 times a day as needed. 30 Tab 0   • metoclopramide (REGLAN) 10 MG Tab Take 1 Tab by mouth 4 times a day as needed (prn nausea). 20 Tab 0     No current facility-administered medications on file prior to visit.         Past Medical History:   Diagnosis Date   • Vertigo                Review of Systems   Respiratory: Negative for shortness of breath.    Cardiovascular: Negative for chest pain.   Neurological: Negative for tingling, numbness and headaches.   All other systems reviewed and are negative.         Objective:     BP (!) 140/86 (BP Location: Right arm, Patient Position: Sitting, BP Cuff Size: Adult)   Pulse 64   Temp 36.5 °C (97.7 °F) (Temporal)   Resp 12   Ht 1.829 m (6')   Wt 87.5 kg (193 lb)   SpO2 95%       Physical Exam   Constitutional: He is oriented to person, place, and time. Pt appears well-developed. No distress.   HENT:   Head: Normocephalic and atraumatic.   Eyes: Conjunctivae are normal.   Cardiovascular: Normal rate.    Pulmonary/Chest:  Effort normal.   Musculoskeletal:        Left shoulder: pt exhibits  decreased range of motion, tenderness (anterior) and pain.  + TTP over medial aspect of clavicle    There is no effusion and no crepitus.  no muscle spasm.    Deltoid ,  strength is 5/5.  No cervical spine tenderness.   Neurological: pt is alert and oriented to person, place, and time.  extremities - neurovascularly intact.  Skin: Skin is warm. Pt is not diaphoretic. No erythema.   Psychiatric: pt behavior is normal.   Nursing note and vitals reviewed.              Assessment/Plan:        shoulder strain  Slowly improving  Restrictions per D39    Follow up in one week

## 2022-06-07 ENCOUNTER — OCCUPATIONAL MEDICINE (OUTPATIENT)
Dept: URGENT CARE | Facility: PHYSICIAN GROUP | Age: 67
End: 2022-06-07
Payer: COMMERCIAL

## 2022-06-07 VITALS
HEIGHT: 72 IN | OXYGEN SATURATION: 99 % | BODY MASS INDEX: 26.68 KG/M2 | TEMPERATURE: 97 F | DIASTOLIC BLOOD PRESSURE: 68 MMHG | RESPIRATION RATE: 18 BRPM | WEIGHT: 197 LBS | HEART RATE: 60 BPM | SYSTOLIC BLOOD PRESSURE: 102 MMHG

## 2022-06-07 DIAGNOSIS — S46.912D LEFT SHOULDER STRAIN, SUBSEQUENT ENCOUNTER: ICD-10-CM

## 2022-06-07 PROCEDURE — 99213 OFFICE O/P EST LOW 20 MIN: CPT | Performed by: PHYSICIAN ASSISTANT

## 2022-06-07 ASSESSMENT — ENCOUNTER SYMPTOMS
DIZZINESS: 0
TINGLING: 0
HEADACHES: 0
WEAKNESS: 0
FALLS: 0
MYALGIAS: 1

## 2022-06-07 NOTE — PROGRESS NOTES
Subjective     Luis Drake Jr. is a 66 y.o. male who presents with Work-Related Injury (DOI 05/26/2022 left side,)      HPI: From 5/27/22  Luis Drake Jr. is a 66 y.o. male who presents to the Emergency Department for left shoulder pain acute onset last night at work. 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 He reports feeling a sudden incident of injury while working last night. His pain is exacerbated with deep breaths. He denies numbness or tingling in the extremity, fevers, chest pain, shortness of breath, nausea, and vomiting. He denies taking any pain medications. He denies taking any blood thinners. He denies any history of rib fracture.     HPI: Today 6/7/22  Visit 3  This is a very pleasant 66-year-old male presented to the clinic for follow-up regarding a work-related injury.  Patient states his symptoms have greatly remained the same.  He has been following all work restrictions.  Pain is made worse with any overhead movement of the left shoulder.  Has been trying to perform gentle range of motion exercises while at home.  Alternating Tylenol and ibuprofen for pain.  No shortness of breath or chest pain.  No paresthesias to the upper extremities.       Review of Systems   Musculoskeletal: Positive for joint pain and myalgias. Negative for falls.   Neurological: Negative for dizziness, tingling, weakness and headaches.     PMH:   No pertinent past medical history to this problem  MEDS:  Medications were reviewed in EMR  ALLERGIES:  Allergies were reviewed in EMR  FH:   No pertinent family history to this problem             Objective     /68 (BP Location: Right arm, Patient Position: Sitting, BP Cuff Size: Adult)   Pulse 60   Temp 36.1 °C (97 °F) (Temporal)   Resp 18   Ht 1.829 m (6')   Wt 89.4 kg (197 lb)   SpO2 99%   BMI 26.72 kg/m²      Physical Exam    Constitutional: Pt is oriented to person, place, and time.  Appears well-developed and well-nourished.  No distress.   Eyes: Conjunctivae are normal.   Cardiovascular: Normal rate.    Pulmonary/Chest: Effort normal.   Musculoskeletal: Left shoulder: No obvious deformity, discoloration or edema appreciated.  Mild tenderness to palpation to the anterior lateral aspect of the left shoulder.  Patient has full shoulder range of motion however range of motion above 90 degrees flexion and abduction reproduces pain.  Sensation intact distally.   strength 5/5 bilaterally.  Neurological: Pt is alert and oriented to person, place, and time. Coordination normal.   Skin: Skin is warm. Pt is not diaphoretic. No erythema.   Psychiatric: Pt has a normal mood and affect.  Behavior is normal.        RADIOLOGY RESULTS   DX-CHEST-2 VIEWS    Result Date: 5/27/2022 5/27/2022 12:11 PM HISTORY/REASON FOR EXAM:  Shortness of breath; Shortness of Breath TECHNIQUE/EXAM DESCRIPTION AND NUMBER OF VIEWS: Two views of the chest. COMPARISON:  None. FINDINGS: HEART: Not enlarged. LUNGS: No areas of air space disease are demonstrated. PLEURA: No effusion or pneumothorax.     No evidence of acute cardiopulmonary disease    DX-CLAVICLE LEFT    Result Date: 5/27/2022 5/27/2022 12:11 PM HISTORY/REASON FOR EXAM:  AC joint pain, traumatic; Pain/Deformity Following Trauma. . TECHNIQUE/EXAM DESCRIPTION AND NUMBER OF VIEWS:  2 views of the LEFT clavicle. COMPARISON: None FINDINGS: Mineralization is unremarkable for age. No displaced fracture is seen. Visualized LEFT lung is clear. There is curvilinear deformity of the humeral head.     1.  No radiographic evidence of acute traumatic injury. 2.  Deformity of the LEFT humeral head suggests remote trauma with prior dislocation, possibly a Hill-Sachs lesion.     '             Assessment & Plan        1. Left shoulder strain, subsequent encounter    See above work restrictions.  Continue gentle range of motion and stretching exercises as demonstrated in clinic.  Alternate Tylenol and ibuprofen for pain.  Ice  as needed for pain and swelling.  Discontinue sling.  We discussed referral to physical therapy.  At this time and agreed to follow-up in clinic in 6/14/2022 for reevaluation.  If no improvement at next visit will refer to occupational medicine and physical therapy.    Differential diagnosis, natural history, supportive care, and indications for immediate follow-up discussed at length.

## 2022-06-07 NOTE — LETTER
[facility]  [address] [place] [address]. [address] - [place] [address]  Phone:  [phone] - Fax:  [phone]   [facility] Progress Report and Disability Certification  Date of Service: 6/7/2022   No Show:  No  Date / Time of Next Visit: 6/14/2022@2:00PM   Claim Information   Patient Name: Luis Drake Jr.  Claim Number:     Employer:   [employer] Date of Injury: 5/26/2022     Insurer / TPA: Luis Pratt  ID / SSN:     Occupation:   Diagnosis: The encounter diagnosis was Left shoulder strain, subsequent encounter.    Medical Information   Related to Industrial Injury? Yes    Subjective Complaints:  HPI: From 5/27/22  Luis Drake Jr. is a 66 y.o. male who presents to the Emergency Department for left shoulder pain acute onset last night at work. 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 He reports feeling a sudden incident of injury while working last night. His pain is exacerbated with deep breaths. He denies numbness or tingling in the extremity, fevers, chest pain, shortness of breath, nausea, and vomiting. He denies taking any pain medications. He denies taking any blood thinners. He denies any history of rib fracture.     HPI: Today 6/7/22  Visit 3  This is a very pleasant 66-year-old male presented to the clinic for follow-up regarding a work-related injury.  Patient states his symptoms have greatly remained the same.  He has been following all work restrictions.  Pain is made worse with any overhead movement of the left shoulder.  Has been trying to perform gentle range of motion exercises while at home.  Alternating Tylenol and ibuprofen for pain.  No shortness of breath or chest pain.  No paresthesias to the upper extremities.   Objective Findings: Constitutional: Pt is oriented to person, place, and time.  Appears well-developed and well-nourished. No distress.   Eyes: Conjunctivae are normal.    Cardiovascular: Normal rate.    Pulmonary/Chest: Effort normal.   Musculoskeletal: Left shoulder: No obvious deformity, discoloration or edema appreciated.  Mild tenderness to palpation to the anterior lateral aspect of the left shoulder.  Patient has full shoulder range of motion however range of motion above 90 degrees flexion and abduction reproduces pain.  Sensation intact distally.   strength 5/5 bilaterally.  Neurological: Pt is alert and oriented to person, place, and time. Coordination normal.   Skin: Skin is warm. Pt is not diaphoretic. No erythema.   Psychiatric: Pt has a normal mood and affect.  Behavior is normal.      Pre-Existing Condition(s):     Assessment:   Condition Same    Status: Additional Care Required  Permanent Disability:No    Plan:      Diagnostics:      Comments:       Disability Information   Status: Released to Restricted Duty    From:  2022  Through: 2022 Restrictions are: Temporary   Physical Restrictions   Sitting:    Standing:    Stooping:    Bending:      Squatting:    Walking:    Climbin hrs/day Pushin hrs/day   Pullin hrs/day Other:    Reaching Above Shoulder (L): 0 hrs/day Reaching Above Shoulder (R):       Reaching Below Shoulder (L):    Reaching Below Shoulder (R):      Not to exceed Weight Limits   Carrying(hrs):   Weight Limit(lb): < or = to 10 pounds Lifting(hrs):   Weight  Limit(lb): < or = to 10 pounds   Comments: See above work restrictions.  Continue gentle range of motion and stretching exercises as demonstrated in clinic.  Alternate Tylenol and ibuprofen for pain.  Ice as needed for pain and swelling.  Discontinue sling.  We discussed referral to physical therapy.  At this time and agreed to follow-up in clinic in 2022 for reevaluation.  If no improvement at next visit will refer to occupational medicine and physical therapy.    Repetitive Actions   Hands: i.e. Fine Manipulations from Grasping:     Feet: i.e. Operating Foot Controls:      Driving / Operate Machinery:     Health Care Provider’s Original or Electronic Signature  Matty Clarke P.A.-C. Health Care Provider’s Original or Electronic Signature    Jordan Goldstein MD         Clinic Name / Location: [address]  [place] [address] Clinic Phone Number: Dept: [phone]   Appointment Time: 8:00 Am Visit Start Time: 8:04 AM   Check-In Time:  7:36 Am Visit Discharge Time:  8:30AM   Original-Treating Physician or Chiropractor    Page 2-Insurer/TPA    Page 3-Employer    Page 4-Employee

## 2022-06-14 ENCOUNTER — OCCUPATIONAL MEDICINE (OUTPATIENT)
Dept: URGENT CARE | Facility: PHYSICIAN GROUP | Age: 67
End: 2022-06-14
Payer: COMMERCIAL

## 2022-06-14 VITALS
TEMPERATURE: 98 F | RESPIRATION RATE: 18 BRPM | DIASTOLIC BLOOD PRESSURE: 82 MMHG | OXYGEN SATURATION: 97 % | WEIGHT: 197 LBS | BODY MASS INDEX: 26.68 KG/M2 | SYSTOLIC BLOOD PRESSURE: 122 MMHG | HEART RATE: 70 BPM | HEIGHT: 72 IN

## 2022-06-14 DIAGNOSIS — S46.912D LEFT SHOULDER STRAIN, SUBSEQUENT ENCOUNTER: ICD-10-CM

## 2022-06-14 PROCEDURE — 99213 OFFICE O/P EST LOW 20 MIN: CPT | Performed by: PHYSICIAN ASSISTANT

## 2022-06-14 ASSESSMENT — ENCOUNTER SYMPTOMS
WEAKNESS: 0
MYALGIAS: 0
TINGLING: 0
HEADACHES: 0

## 2022-06-14 NOTE — PROGRESS NOTES
Subjective     Luis Drake Jr. is a 66 y.o. male who presents with Work-Related Injury (Wc fv /DOI: 5/26/22/Company: [employer] /Right arm, pt states they are feeling better)      HPI:  DOI: 5/26/2022  Visit #4  This is a very pleasant 66-year-old male presents to the clinic for follow-up regarding work-related injury.  Patient states his symptoms have greatly improved since last visit.  He is no longer experiencing any pain to the left shoulder.  He has full and pain-free range of motion of the left shoulder.  No difficulty with overhead activity.  No numbness or tingling to the upper extremity.  Currently not taking any OTC medications for pain.  States he had follow-up with his  at work and they reviewed job activities that may be producing his shoulder pain.  They altered some of the activities he is performing at work and he has been able to tolerate current duties without any complication.  Requesting to return to full duty at this time.       Review of Systems   Musculoskeletal: Negative for joint pain and myalgias.   Neurological: Negative for tingling, weakness and headaches.     PMH:   No pertinent past medical history to this problem  MEDS:  Medications were reviewed in EMR  ALLERGIES:  Allergies were reviewed in EMR  FH:   No pertinent family history to this problem             Objective     /82 (BP Location: Right arm, Patient Position: Sitting, BP Cuff Size: Adult)   Pulse 70   Temp 36.7 °C (98 °F) (Temporal)   Resp 18   Ht 1.829 m (6')   Wt 89.4 kg (197 lb)   SpO2 97%   BMI 26.72 kg/m²      Physical Exam    Constitutional: Pt is oriented to person, place, and time.  Appears well-developed and well-nourished. No distress.   Eyes: Conjunctivae are normal.   Cardiovascular: Normal rate.    Pulmonary/Chest: Effort normal.   Musculoskeletal: Left shoulder: No obvious deformity or edema present.  No bony tenderness to palpation.  Patient demonstrates full and pain-free  shoulder range of motion.  No pain with overhead activity.  Negative empty can test.  Negative Neer's and Sheldon.   strength 5/5.  Neurological: Pt is alert and oriented to person, place, and time. Coordination normal.   Skin: Skin is warm. Pt is not diaphoretic. No erythema.   Psychiatric: Pt has a normal mood and affect.  Behavior is normal.              Assessment & Plan        1. Left shoulder strain, subsequent encounter    Patient symptoms have fully improved since last visit.  At this time he is able to return to full duty without restriction.  Discussed range of motion and strengthening exercises to perform at home.  Alternate Tylenol and ibuprofen if needed for pain.  Return for any persistence or worsening of symptoms.  Discharge MMI.    Differential diagnosis, natural history, supportive care, and indications for immediate follow-up discussed at length.

## 2022-06-14 NOTE — LETTER
[facility]  [address] - [place] [address]  Phone:  [phone] - Fax:  [phone]   [facility] Progress Report and Disability Certification  Date of Service: 6/14/2022   No Show:  No  Date / Time of Next Visit:     Claim Information   Patient Name: Luis Drake Jr.  Claim Number:     Employer:   [employer] Date of Injury: 5/26/2022     Insurer / TPA: Luis Lockhart  ID / SSN:     Occupation:   Diagnosis: The encounter diagnosis was Left shoulder strain, subsequent encounter.    Medical Information   Related to Industrial Injury? Yes    Subjective Complaints:  HPI:  DOI: 5/26/2022  Visit #4  This is a very pleasant 66-year-old male presents to the clinic for follow-up regarding work-related injury.  Patient states his symptoms have greatly improved since last visit.  He is no longer experiencing any pain to the left shoulder.  He has full and pain-free range of motion of the left shoulder.  No difficulty with overhead activity.  No numbness or tingling to the upper extremity.  Currently not taking any OTC medications for pain.  States he had follow-up with his  at work and they reviewed job activities that may be producing his shoulder pain.  They altered some of the activities he is performing at work and he has been able to tolerate current duties without any complication.  Requesting to return to full duty at this time.   Objective Findings: Constitutional: Pt is oriented to person, place, and time.  Appears well-developed and well-nourished. No distress.   Eyes: Conjunctivae are normal.   Cardiovascular: Normal rate.    Pulmonary/Chest: Effort normal.   Musculoskeletal: Left shoulder: No obvious deformity or edema present.  No bony tenderness to palpation.  Patient demonstrates full and pain-free shoulder range of motion.  No pain with overhead activity.  Negative empty can test.  Negative  Neer's and Sheldon.   strength 5/5.  Neurological: Pt is alert and oriented to person, place, and time. Coordination normal.   Skin: Skin is warm. Pt is not diaphoretic. No erythema.   Psychiatric: Pt has a normal mood and affect.  Behavior is normal.      Pre-Existing Condition(s):     Assessment:   Condition Improved    Status: Discharged /  MMI  Permanent Disability:No    Plan:      Diagnostics:      Comments:       Disability Information   Status: Released to Full Duty    From:  6/14/2022  Through:   Restrictions are:     Physical Restrictions   Sitting:    Standing:    Stooping:    Bending:      Squatting:    Walking:    Climbing:    Pushing:      Pulling:    Other:    Reaching Above Shoulder (L):   Reaching Above Shoulder (R):       Reaching Below Shoulder (L):    Reaching Below Shoulder (R):      Not to exceed Weight Limits   Carrying(hrs):   Weight Limit(lb):   Lifting(hrs):   Weight  Limit(lb):     Comments: Patient symptoms have fully improved since last visit.  At this time he is able to return to full duty without restriction.  Discussed range of motion and strengthening exercises to perform at home.  Alternate Tylenol and ibuprofen if needed for pain.  Return for any persistence or worsening of symptoms.  Discharge MMI.    Repetitive Actions   Hands: i.e. Fine Manipulations from Grasping:     Feet: i.e. Operating Foot Controls:     Driving / Operate Machinery:     Health Care Provider’s Original or Electronic Signature  Matty Clarke P.A.-C. Health Care Provider’s Original or Electronic Signature    Jordan Goldstein MD         Clinic Name / Location: [address]  [place] [address] Clinic Phone Number: Dept: [phone]   Appointment Time: 2:00 Pm Visit Start Time: 2:32 PM   Check-In Time:  1:39 Pm Visit Discharge Time:  3:19PM   Original-Treating Physician or Chiropractor    Page 2-Insurer/TPA    Page 3-Employer    Page 4-Employee

## 2022-11-04 ENCOUNTER — PATIENT MESSAGE (OUTPATIENT)
Dept: HEALTH INFORMATION MANAGEMENT | Facility: OTHER | Age: 67
End: 2022-11-04